# Patient Record
Sex: FEMALE | Race: WHITE | NOT HISPANIC OR LATINO | Employment: UNEMPLOYED
[De-identification: names, ages, dates, MRNs, and addresses within clinical notes are randomized per-mention and may not be internally consistent; named-entity substitution may affect disease eponyms.]

---

## 2021-05-01 ENCOUNTER — HEALTH MAINTENANCE LETTER (OUTPATIENT)
Age: 51
End: 2021-05-01

## 2021-10-11 ENCOUNTER — HEALTH MAINTENANCE LETTER (OUTPATIENT)
Age: 51
End: 2021-10-11

## 2021-10-18 ENCOUNTER — HOSPITAL ENCOUNTER (EMERGENCY)
Facility: CLINIC | Age: 51
Discharge: HOME | End: 2021-10-18
Attending: EMERGENCY MEDICINE | Admitting: EMERGENCY MEDICINE
Payer: COMMERCIAL

## 2021-10-18 ENCOUNTER — APPOINTMENT (OUTPATIENT)
Dept: CT IMAGING | Facility: CLINIC | Age: 51
End: 2021-10-18
Attending: EMERGENCY MEDICINE
Payer: COMMERCIAL

## 2021-10-18 VITALS
DIASTOLIC BLOOD PRESSURE: 63 MMHG | OXYGEN SATURATION: 100 % | SYSTOLIC BLOOD PRESSURE: 118 MMHG | RESPIRATION RATE: 16 BRPM | WEIGHT: 170 LBS | HEART RATE: 59 BPM | TEMPERATURE: 98.3 F

## 2021-10-18 DIAGNOSIS — N20.0 KIDNEY STONE: ICD-10-CM

## 2021-10-18 LAB
ALBUMIN SERPL-MCNC: 3.6 G/DL (ref 3.4–5)
ALBUMIN UR-MCNC: NEGATIVE MG/DL
ALP SERPL-CCNC: 68 U/L (ref 40–150)
ALT SERPL W P-5'-P-CCNC: 31 U/L (ref 0–50)
ANION GAP SERPL CALCULATED.3IONS-SCNC: 8 MMOL/L (ref 3–14)
APPEARANCE UR: CLEAR
AST SERPL W P-5'-P-CCNC: 21 U/L (ref 0–45)
BASOPHILS # BLD AUTO: 0.1 10E3/UL (ref 0–0.2)
BASOPHILS NFR BLD AUTO: 1 %
BILIRUB SERPL-MCNC: 0.6 MG/DL (ref 0.2–1.3)
BILIRUB UR QL STRIP: NEGATIVE
BUN SERPL-MCNC: 10 MG/DL (ref 7–30)
CALCIUM SERPL-MCNC: 8.6 MG/DL (ref 8.5–10.1)
CHLORIDE BLD-SCNC: 105 MMOL/L (ref 94–109)
CO2 SERPL-SCNC: 24 MMOL/L (ref 20–32)
COLOR UR AUTO: YELLOW
CREAT SERPL-MCNC: 0.7 MG/DL (ref 0.52–1.04)
EOSINOPHIL # BLD AUTO: 0 10E3/UL (ref 0–0.7)
EOSINOPHIL NFR BLD AUTO: 0 %
ERYTHROCYTE [DISTWIDTH] IN BLOOD BY AUTOMATED COUNT: 13.2 % (ref 10–15)
GFR SERPL CREATININE-BSD FRML MDRD: >90 ML/MIN/1.73M2
GLUCOSE BLD-MCNC: 114 MG/DL (ref 70–99)
GLUCOSE UR STRIP-MCNC: NEGATIVE MG/DL
HCT VFR BLD AUTO: 41.4 % (ref 35–47)
HGB BLD-MCNC: 13.7 G/DL (ref 11.7–15.7)
HGB UR QL STRIP: ABNORMAL
IMM GRANULOCYTES # BLD: 0 10E3/UL
IMM GRANULOCYTES NFR BLD: 0 %
KETONES UR STRIP-MCNC: 20 MG/DL
LEUKOCYTE ESTERASE UR QL STRIP: NEGATIVE
LIPASE SERPL-CCNC: 86 U/L (ref 73–393)
LYMPHOCYTES # BLD AUTO: 0.7 10E3/UL (ref 0.8–5.3)
LYMPHOCYTES NFR BLD AUTO: 5 %
MCH RBC QN AUTO: 31 PG (ref 26.5–33)
MCHC RBC AUTO-ENTMCNC: 33.1 G/DL (ref 31.5–36.5)
MCV RBC AUTO: 94 FL (ref 78–100)
MONOCYTES # BLD AUTO: 0.5 10E3/UL (ref 0–1.3)
MONOCYTES NFR BLD AUTO: 4 %
MUCOUS THREADS #/AREA URNS LPF: PRESENT /LPF
NEUTROPHILS # BLD AUTO: 12 10E3/UL (ref 1.6–8.3)
NEUTROPHILS NFR BLD AUTO: 90 %
NITRATE UR QL: NEGATIVE
NRBC # BLD AUTO: 0 10E3/UL
NRBC BLD AUTO-RTO: 0 /100
PH UR STRIP: 6.5 [PH] (ref 5–7)
PLATELET # BLD AUTO: 255 10E3/UL (ref 150–450)
POTASSIUM BLD-SCNC: 3.9 MMOL/L (ref 3.4–5.3)
PROT SERPL-MCNC: 7.3 G/DL (ref 6.8–8.8)
RBC # BLD AUTO: 4.42 10E6/UL (ref 3.8–5.2)
RBC URINE: 17 /HPF
SODIUM SERPL-SCNC: 137 MMOL/L (ref 133–144)
SP GR UR STRIP: 1.02 (ref 1–1.03)
SQUAMOUS EPITHELIAL: <1 /HPF
UROBILINOGEN UR STRIP-MCNC: NORMAL MG/DL
WBC # BLD AUTO: 13.3 10E3/UL (ref 4–11)
WBC URINE: 1 /HPF
YEAST #/AREA URNS HPF: ABNORMAL /HPF

## 2021-10-18 PROCEDURE — 99285 EMERGENCY DEPT VISIT HI MDM: CPT | Mod: 25

## 2021-10-18 PROCEDURE — 74176 CT ABD & PELVIS W/O CONTRAST: CPT

## 2021-10-18 PROCEDURE — 85025 COMPLETE CBC W/AUTO DIFF WBC: CPT | Performed by: EMERGENCY MEDICINE

## 2021-10-18 PROCEDURE — 81001 URINALYSIS AUTO W/SCOPE: CPT | Performed by: EMERGENCY MEDICINE

## 2021-10-18 PROCEDURE — 36415 COLL VENOUS BLD VENIPUNCTURE: CPT | Performed by: EMERGENCY MEDICINE

## 2021-10-18 PROCEDURE — 96374 THER/PROPH/DIAG INJ IV PUSH: CPT

## 2021-10-18 PROCEDURE — 83690 ASSAY OF LIPASE: CPT | Performed by: EMERGENCY MEDICINE

## 2021-10-18 PROCEDURE — 80053 COMPREHEN METABOLIC PANEL: CPT | Performed by: EMERGENCY MEDICINE

## 2021-10-18 PROCEDURE — 81003 URINALYSIS AUTO W/O SCOPE: CPT | Performed by: EMERGENCY MEDICINE

## 2021-10-18 PROCEDURE — 250N000011 HC RX IP 250 OP 636: Performed by: EMERGENCY MEDICINE

## 2021-10-18 RX ORDER — KETOROLAC TROMETHAMINE 15 MG/ML
15 INJECTION, SOLUTION INTRAMUSCULAR; INTRAVENOUS ONCE
Status: COMPLETED | OUTPATIENT
Start: 2021-10-18 | End: 2021-10-18

## 2021-10-18 RX ADMIN — KETOROLAC TROMETHAMINE 15 MG: 15 INJECTION, SOLUTION INTRAMUSCULAR; INTRAVENOUS at 20:12

## 2021-10-18 ASSESSMENT — ENCOUNTER SYMPTOMS
CHILLS: 1
FEVER: 0
DYSURIA: 0
DIARRHEA: 1
ABDOMINAL PAIN: 1
BLOOD IN STOOL: 0
BACK PAIN: 1

## 2021-10-18 NOTE — ED PROVIDER NOTES
History   Chief Complaint:  Abdominal Pain       HPI   Echo Edwards is a 51 year old female who presents with right lower quadrant abdominal pain with radiation into her back that started this morning. She notes that her pain subsides but shortly returned towards her umbilicus. Again, the pain subside and returned back to the right side. The patient called her PCP for advice who recommended evaluation. Here, her pain is a 5/10. Mentions that she does have chills and had about 4-5 episodes of diarrhea this morning. Reports a history of tubal ligation but denies other surgeries. She denies fever, bloody stools, or dysuria. Occasional alcohol. No drug or tobacco use.     Review of Systems   Constitutional: Positive for chills. Negative for fever.   Gastrointestinal: Positive for abdominal pain and diarrhea. Negative for blood in stool.   Genitourinary: Negative for dysuria.   Musculoskeletal: Positive for back pain.   All other systems reviewed and are negative.    Allergies:  The patient has no known allergies.     Medications:  The patient is not currently taking any prescribed medications.    Past Medical History:     Skin cancer    Past Surgical History:    Tubal ligation    Social History:  The patient presents to the ED alone.     Physical Exam     Patient Vitals for the past 24 hrs:   BP Temp Temp src Pulse Resp SpO2 Weight   10/18/21 1731 118/63 98.3  F (36.8  C) Temporal 59 16 100 % --   10/18/21 1728 -- 98  F (36.7  C) Temporal -- -- 98 % 77.1 kg (170 lb)     Physical Exam     General: alert, lying comfortably on gurney  HENT: mucous membranes moist  CV: regular rate, regular rhythm  Resp: normal effort, clear throughout, no crackles or wheezing  GI: abdomen soft.  Mild right sided CVA tenderness.  Mild right lower quadrant abdominal tenderness.  MSK: no bony tenderness  Skin: appropriately warm and dry  Extremities: no edema, calves non-tender  Neuro: alert, clear speech, oriented  Psych: normal mood  and affect    Emergency Department Course     Imaging:  CT Abdomen Pelvis w/o Contrast   Final Result   IMPRESSION:    1.  Moderate right-sided hydronephrosis. There are multiple calcifications in the pelvis with several of these near the right ureter. These are compatible with phleboliths but there is likely a small 2 to 3 mm stone in the distal right ureter.      2.  5.5 cm cyst within the right ovary.           Report per radiology    Laboratory:  Labs Ordered and Resulted from Time of ED Arrival Up to the Time of Departure from the ED   UA MACROSCOPIC WITH REFLEX TO MICRO AND CULTURE - Abnormal; Notable for the following components:       Result Value    Ketones Urine 20  (*)     Blood Urine Moderate (*)     Budding Yeast Urine Few (*)     Mucus Urine Present (*)     RBC Urine 17 (*)     All other components within normal limits    Narrative:     Urine Culture not indicated   COMPREHENSIVE METABOLIC PANEL - Abnormal; Notable for the following components:    Glucose 114 (*)     All other components within normal limits   CBC WITH PLATELETS AND DIFFERENTIAL - Abnormal; Notable for the following components:    WBC Count 13.3 (*)     Absolute Neutrophils 12.0 (*)     Absolute Lymphocytes 0.7 (*)     All other components within normal limits   LIPASE - Normal   PERIPHERAL IV CATHETER   CBC WITH PLATELETS & DIFFERENTIAL    Narrative:     The following orders were created for panel order CBC with platelets differential.  Procedure                               Abnormality         Status                     ---------                               -----------         ------                     CBC with platelets and d...[[id number]]  Abnormal            Final result                 Please view results for these tests on the individual orders.      Emergency Department Course:  Reviewed:  I reviewed nursing notes, vitals, past medical history and Care Everywhere    Assessments:  1826 I obtained history and examined the  patient as noted above.     2009 I rechecked the patient and explained findings.     Interventions:  2012 Toradol 15 mg IV    Disposition:  The patient was discharged to home.     Impression & Plan     Medical Decision Making:  Echo Edwards is a 51 year old female who presented with right flank and abdominal pain consistent with renal colic. CT confirms a likely 3 mm ureteral stone at the distal ureter.  Renal function is normal.  CT and lab workup show no other alternative etiology that could be causing her symptoms (e.g., AAA, appendicitis, pyelonephritis). There is no fever or convincing evidence of a urinary tract infection. On recheck, her pain is controlled with interventions in the ED and she is tolerating POs. I taking ibuprofen and Tylenol as directed for pain, given that symptoms are mild and she wishes to avoid narcotics. I have advised her to return for uncontrolled pain, vomiting, fever, or any other concerning symptoms. I also advised to strain her urine to look for a stone.  Finally, I have advised follow up with urology within 5 to 7 days if she fails to pass the stone.    Diagnosis:    ICD-10-CM    1. Kidney stone  N20.0      Scribe Disclosure:  I, Alphonse Concepcion, am serving as a scribe at 6:40 PM on 10/18/2021 to document services personally performed by Virgen Cruz MD based on my observations and the provider's statements to me.           Virgen Cruz MD  10/18/21 2563

## 2021-10-19 NOTE — DISCHARGE INSTRUCTIONS
You can take ibuprofen, 400 mg every 8 hours as needed for pain.  You can also take Tylenol as needed, as directed on the bottle.    Discharge Instructions  Kidney Stones    Kidney stones are a common problem that can cause a lot of pain but fortunately are usually not dangerous. Kidney stones form in the kidney and then can cause a blockage (obstruction) of the flow of urine from the kidney which leads to pain. Most patients can manage kidney stones at home (without a hospital stay).  However, sometimes your condition may be worse than it seemed at first, or may get worse with time. Most kidney stones will pass on their own, but occasionally stones may need to be removed by an urologist.    Generally, every Emergency Department visit should have a follow-up clinic visit with either a primary or a specialty clinic/provider. Please follow-up as instructed by your emergency provider today.      Return to the Emergency Department if:  Your pain is not controlled despite the medications provided or recommended.  You are vomiting (throwing up) and cannot keep fluids or medications down.  You develop a fever (>100.4 F).  You feel much more ill or develop new symptoms.  What can I do to help myself?  Be sure to drink plenty of fluids.  If instructed to do so, strain your urine (pee) with the urine strainer you were provided with today. Your stone may look like a grain of sand or a small pebble. Collect any stones in the cup provided and bring to your follow-up appointment.  Staying active is good, and may help the stone to pass. You may do whatever you feel up to doing without restrictions.   Treatment:  Non-steroidal anti-inflammatory drugs (NSAIDs). This includes prescription medicines like Toradol  (ketorolac) and non-prescription medicines like Advil  (ibuprofen) and Nuprin  (ibuprofen) and Naproxen. These pain relievers are very effective for kidney stones.  Nausea (sick to your stomach) medication.  Nausea and  vomiting are common with kidney stones, so your provider may send you home with medicine for this.   Flomax  (tamsulosin). This medicine is sometimes used for men with prostate problems, but also can help kidney stones to pass. Its effectiveness is controversial or questionable so it is prescribed in certain situations. This medicine can lower blood pressure, and you may feel faint/lightheaded, especially when you first stand up. Be sure to get up gradually, sit down if you feel faint, and avoid activity where feeling faint would be dangerous, such as climbing ladders.  If you were given a prescription for medicine here today, be sure to read all of the information (including the package insert) that comes with your prescription.  This will include important information about the medicine, its side effects, and any warnings that you need to know about.  The pharmacist who fills the prescription can provide more information and answer questions you may have about the medicine.  If you have questions or concerns that the pharmacist cannot address, please call or return to the Emergency Department.   Remember that you can always come back to the Emergency Department if you are not able to see your regular provider in the amount of time listed above, if you get any new symptoms, or if there is anything that worries you.

## 2021-10-26 ENCOUNTER — OFFICE VISIT (OUTPATIENT)
Dept: UROLOGY | Facility: CLINIC | Age: 51
End: 2021-10-26
Payer: COMMERCIAL

## 2021-10-26 VITALS
DIASTOLIC BLOOD PRESSURE: 60 MMHG | SYSTOLIC BLOOD PRESSURE: 92 MMHG | WEIGHT: 170 LBS | HEART RATE: 82 BPM | BODY MASS INDEX: 25.76 KG/M2 | OXYGEN SATURATION: 98 % | HEIGHT: 68 IN

## 2021-10-26 DIAGNOSIS — N20.0 CALCULUS OF KIDNEY: Primary | ICD-10-CM

## 2021-10-26 LAB
ALBUMIN UR-MCNC: NEGATIVE MG/DL
APPEARANCE UR: CLEAR
BILIRUB UR QL STRIP: NEGATIVE
COLOR UR AUTO: YELLOW
GLUCOSE UR STRIP-MCNC: NEGATIVE MG/DL
HGB UR QL STRIP: ABNORMAL
KETONES UR STRIP-MCNC: NEGATIVE MG/DL
LEUKOCYTE ESTERASE UR QL STRIP: NEGATIVE
NITRATE UR QL: NEGATIVE
PH UR STRIP: 7 [PH] (ref 5–7)
SP GR UR STRIP: 1.01 (ref 1–1.03)
UROBILINOGEN UR STRIP-ACNC: 0.2 E.U./DL

## 2021-10-26 PROCEDURE — 99204 OFFICE O/P NEW MOD 45 MIN: CPT | Performed by: UROLOGY

## 2021-10-26 PROCEDURE — 81003 URINALYSIS AUTO W/O SCOPE: CPT | Mod: QW | Performed by: UROLOGY

## 2021-10-26 ASSESSMENT — MIFFLIN-ST. JEOR: SCORE: 1424.61

## 2021-10-26 ASSESSMENT — PAIN SCALES - GENERAL: PAINLEVEL: NO PAIN (0)

## 2021-10-26 NOTE — PROGRESS NOTES
Name: Echo Edwards   MRN: [id number]  YOB: 1970    Assessment and Plan:  51 year old female with right ureteral stone that is possibly been passed.     1. Calculus of kidney  Discussed options including observation, ultrasound, or CT of the pelvis.  Given the fact that she may have passed a stone however what she remembers straining does not seem like it could be the appropriate size for the ureteral stone that she had.  Although she has resolution of pain, discussed that there are's patients who still have retained stone that can lead to further issues down the road with loss of renal function.  Would not recommend KUB given multiple pelvic phleboliths seen on CT.    After discussion, we will do a CT of the pelvis to ensure passage of the stone.  If is not passed, would need to further discuss ureteroscopy to manage his stone.    Will contact patient back with the CT results.  Given this is her first stone and there are no remaining ones in the kidney, she will consider a 24-hour urine study but is not obligated to do this at this time and is likely at low risk for future stone development.    Plan: follow-up CT results by phone.    - UA without Microscopic [[id number]]; Future  - UA without Microscopic [[id number]]  - CT Abdomen Pelvis w/o Contrast [[id number]]; Future        Orders Placed This Encounter   Procedures     UA without Microscopic [[id number]]            Chief Complaint: right ureteral stone    History of Present Illness:  Echo Edwards is a 51 year old female seen in consultation from ED for discussion right ureteral stone.      The current episode was first found due to renal colic.  This led to evaluation in the ED which demonstrated 3 mm right ureteral stone.  Imaging (CT on 10/18) (images personally reviewed) which demonstrated:  Right Kidney: 3 mm distal ureteral  Left Kidney: None  Additional relevant findings: Adnexal cyst    Currently, they are experiencing no flank pain, no nausea,  "no vomiting, no hematuria, or no dysuria.  They have not experienced recent stone passage.      I reviewed internal records which in summarized above.    I reviewed internal labs, of which pertinent ones include: Hgb 13.7, Cr 0.7, Ca 8.6, urine pH 7.0    Pertinent stone history:  -- Personal stone history  -- Family stone history    Pertinent stone medical history  -- Gastric bypass surgery  -- Sarcoidosis  -- Inflammatory bowel disease  -- History of non-stone  surgery -  -- Neurologic disease limiting mobility  -- Osteoporosis  -- Diabetes  -- Gout    Pertinent medications:  -- Antiplatelet  -- Anticoagulant  -- Topiramate   -- Thiazaide  -- Potassium supplement         Past Medical History:  Past Medical History:   Diagnosis Date     Kidney stone             Past Surgical History:  History reviewed. No pertinent surgical history.         Social History:  Social History     Tobacco Use     Smoking status: Never Smoker     Smokeless tobacco: Never Used   Substance Use Topics     Alcohol use: Yes     Comment: rare     Drug use: Never            Family History:  History reviewed. No pertinent family history.         Allergies:  No Known Allergies         Medications:  No current outpatient medications on file.     No current facility-administered medications for this visit.       Review of Systems:   ROS: 10 point ROS neg other than the symptoms noted above in the HPI.    Physical Exam:  B/P: 92/60, T: Data Unavailable, P: 82, R: Data Unavailable  Estimated body mass index is 25.85 kg/m  as calculated from the following:    Height as of this encounter: 1.727 m (5' 8\").    Weight as of this encounter: 77.1 kg (170 lb).  General Appearance Adult: Alert, no acute distress, oriented  Lungs: no respiratory distress, or pursed lip breathing  Neuro: Alert, oriented, speech and mentation normal  Psych: affect and mood normal       Labs:     Creatinine   Date Value Ref Range Status   10/18/2021 0.70 0.52 - 1.04 mg/dL Final "     No results found for: UA  No components found for: UC      Outside records:   I spent 0 minutes reviewing outside records.      Matt Escobedo MD  October 26, 2021

## 2021-10-26 NOTE — LETTER
10/26/2021       RE: Echo Edwards  [address] [place] [address]     Dear Colleague,    Thank you for referring your patient, Echo Edwards, to the [facility]. Please see a copy of my visit note below.    Name: Echo Edwards   MRN: [id number]  YOB: 1970    Assessment and Plan:  51 year old female with right ureteral stone that is possibly been passed.     1. Calculus of kidney  Discussed options including observation, ultrasound, or CT of the pelvis.  Given the fact that she may have passed a stone however what she remembers straining does not seem like it could be the appropriate size for the ureteral stone that she had.  Although she has resolution of pain, discussed that there are's patients who still have retained stone that can lead to further issues down the road with loss of renal function.  Would not recommend KUB given multiple pelvic phleboliths seen on CT.    After discussion, we will do a CT of the pelvis to ensure passage of the stone.  If is not passed, would need to further discuss ureteroscopy to manage his stone.    Will contact patient back with the CT results.  Given this is her first stone and there are no remaining ones in the kidney, she will consider a 24-hour urine study but is not obligated to do this at this time and is likely at low risk for future stone development.    Plan: follow-up CT results by phone.    - UA without Microscopic [[id number]]; Future  - UA without Microscopic [[id number]]  - CT Abdomen Pelvis w/o Contrast [[id number]]; Future        Orders Placed This Encounter   Procedures     UA without Microscopic [[id number]]            Chief Complaint: right ureteral stone    History of Present Illness:  Echo Edwards is a 51 year old female seen in consultation from ED for discussion right ureteral stone.      The current episode was first found due to renal colic.  This led  "to evaluation in the ED which demonstrated 3 mm right ureteral stone.  Imaging (CT on 10/18) (images personally reviewed) which demonstrated:  Right Kidney: 3 mm distal ureteral  Left Kidney: None  Additional relevant findings: Adnexal cyst    Currently, they are experiencing no flank pain, no nausea, no vomiting, no hematuria, or no dysuria.  They have not experienced recent stone passage.      I reviewed internal records which in summarized above.    I reviewed internal labs, of which pertinent ones include: Hgb 13.7, Cr 0.7, Ca 8.6, urine pH 7.0    Pertinent stone history:  -- Personal stone history  -- Family stone history    Pertinent stone medical history  -- Gastric bypass surgery  -- Sarcoidosis  -- Inflammatory bowel disease  -- History of non-stone  surgery -  -- Neurologic disease limiting mobility  -- Osteoporosis  -- Diabetes  -- Gout    Pertinent medications:  -- Antiplatelet  -- Anticoagulant  -- Topiramate   -- Thiazaide  -- Potassium supplement         Past Medical History:  Past Medical History:   Diagnosis Date     Kidney stone             Past Surgical History:  History reviewed. No pertinent surgical history.         Social History:  Social History     Tobacco Use     Smoking status: Never Smoker     Smokeless tobacco: Never Used   Substance Use Topics     Alcohol use: Yes     Comment: rare     Drug use: Never            Family History:  History reviewed. No pertinent family history.         Allergies:  No Known Allergies         Medications:  No current outpatient medications on file.     No current facility-administered medications for this visit.       Review of Systems:   ROS: 10 point ROS neg other than the symptoms noted above in the HPI.    Physical Exam:  B/P: 92/60, T: Data Unavailable, P: 82, R: Data Unavailable  Estimated body mass index is 25.85 kg/m  as calculated from the following:    Height as of this encounter: 1.727 m (5' 8\").    Weight as of this encounter: 77.1 kg (170 " lb).  General Appearance Adult: Alert, no acute distress, oriented  Lungs: no respiratory distress, or pursed lip breathing  Neuro: Alert, oriented, speech and mentation normal  Psych: affect and mood normal       Labs:     Creatinine   Date Value Ref Range Status   10/18/2021 0.70 0.52 - 1.04 mg/dL Final     No results found for: UA  No components found for: UC      Outside records:   I spent 0 minutes reviewing outside records.      Matt Escobedo MD  October 26, 2021

## 2021-10-28 ENCOUNTER — HOSPITAL ENCOUNTER (OUTPATIENT)
Dept: CT IMAGING | Facility: CLINIC | Age: 51
Discharge: HOME | End: 2021-10-28
Attending: UROLOGY | Admitting: UROLOGY
Payer: COMMERCIAL

## 2021-10-28 DIAGNOSIS — N20.0 CALCULUS OF KIDNEY: ICD-10-CM

## 2021-10-28 PROCEDURE — 74176 CT ABD & PELVIS W/O CONTRAST: CPT

## 2021-11-22 ENCOUNTER — LAB REQUISITION (OUTPATIENT)
Dept: LAB | Facility: CLINIC | Age: 51
End: 2021-11-22
Payer: COMMERCIAL

## 2021-11-22 PROCEDURE — 88305 TISSUE EXAM BY PATHOLOGIST: CPT | Mod: TC,ORL | Performed by: SPECIALIST

## 2021-11-23 LAB
PATH REPORT.COMMENTS IMP SPEC: NORMAL
PATH REPORT.COMMENTS IMP SPEC: NORMAL
PATH REPORT.FINAL DX SPEC: NORMAL
PATH REPORT.GROSS SPEC: NORMAL
PATH REPORT.MICROSCOPIC SPEC OTHER STN: NORMAL
PATH REPORT.RELEVANT HX SPEC: NORMAL
PHOTO IMAGE: NORMAL

## 2021-11-23 PROCEDURE — 88305 TISSUE EXAM BY PATHOLOGIST: CPT | Mod: 26 | Performed by: PATHOLOGY

## 2022-05-10 ENCOUNTER — TELEPHONE (OUTPATIENT)
Dept: OTHER | Facility: CLINIC | Age: 52
End: 2022-05-10
Payer: COMMERCIAL

## 2022-05-10 NOTE — TELEPHONE ENCOUNTER
[facility]    Who is the provider?:  Self referral     Preferred provider location: [place]    Person calling: Echo Thompson  Call back phone: [phone]       Nurse call back needed: NO          Can we leave a message?  YES        Reason for call:    [facility] evaluated patient for blunt force trauma to varicose vein on lower, inner left leg. Patient is experiencing sharp pain at site of injury and wants to be evalauted by [facility].    Outside imaging: X-ray @ [facility]    Name / Loc of pharmacy: n/a

## 2022-05-16 ENCOUNTER — OFFICE VISIT (OUTPATIENT)
Dept: VASCULAR SURGERY | Facility: CLINIC | Age: 52
End: 2022-05-16
Payer: COMMERCIAL

## 2022-05-16 DIAGNOSIS — I83.92 ASYMPTOMATIC VARICOSE VEINS OF LEFT LOWER EXTREMITY: Primary | ICD-10-CM

## 2022-05-16 PROCEDURE — 99203 OFFICE O/P NEW LOW 30 MIN: CPT | Performed by: INTERNAL MEDICINE

## 2022-05-16 RX ORDER — MEDROXYPROGESTERONE ACETATE 10 MG
TABLET ORAL
COMMUNITY
Start: 2022-04-18

## 2022-05-16 NOTE — PROGRESS NOTES
"    Vein Clinic Consultation Note    HPI:  Echo Edwards is a 52 year old female who was seen today in consultation for left lower leg pain, swelling and bruising following blunt force injury.  Echo states that about a week and a half ago her  tripped and landed on her left lower leg.  She noted immediate pain and over several days worsening bruising and swelling over the anterior shin and ankle. She was evaluated by [facility] where plain films were performed and patient reports were negative for any fracture.  She was advised to wear compression stockings.  She keeps her leg elevated when at rest, but has also been careful to stay moving around the house, shopping etc.  She was instructed to make the \"alphabet\" with her left food/leg to keep things moving.  She denies any redness, warmth, pain in the posterior calf.    She has a hx of asymptomatic varicose veins on her left leg and presents today to make sure that there are no issues with her veins.  Echo states she first noted these varicose veins on her left leg following her pregnancy fifteen years ago.  She denies any symptoms such has pain, heaviness, itching.  No hx of bleeding or thrombophlebitis.        Review of Systems   All other systems reviewed and are negative.      PHH:    Past Medical History:   Diagnosis Date     Kidney stone       No past surgical history on file.    ALLERGIES:  Patient has no known allergies.    No Known Allergies    MEDS:  No current outpatient medications on file.    SOCIAL HABITS:    History   Smoking Status     Never Smoker   Smokeless Tobacco     Never Used     Social History    Substance and Sexual Activity      Alcohol use: Yes        Comment: rare      History   Drug Use Unknown       PHYSICAL EXAMINATION:  Focused exam of the leg leg:  There is a large area of hematoma present in the anterior shin and extending to the area of the anterior/medial ankle with associated swelling.  The posterior calf is soft and " nontender.  There are small visible varicosities noted in the distal anterior thigh and the medial knee.           VEIN CLINIC LEG DRAWING    ASSESSMENT:   1.  Large anterior shin hematoma:  Following blunt force trauma.  She has been wearing compression stockings and has remained active.  No posterior calf tenderness, swelling, erythema.    2.  Asymptomatic varicose veins        PLAN:   -Recommend continued compression stockings to be worn daily, leg elevation while at rest and remaining active.  She will continue to be active and is performing some leg exercises as recommended by [facility].  No indication for ultrasound at this time.    -Reviewed the pathophysiology of varicose veins and treatment.  Recommend continued medical management with daily compression therapy.  -Okay to fly later this week with compression stockings in place and taking brief walks on the plane.  -Follow up PRN.        Deborah Barr MD [facility]   no

## 2022-05-16 NOTE — NURSING NOTE
Patient Reported symptoms:    Left Leg   Heaviness None of the time   Achiness A little of the time   Swelling None of the time   Throbbing None of the time   Itching None of the time   Appearance Slightly noticeable   Impact on work/activities Symptoms but full able to participate

## 2022-05-16 NOTE — LETTER
"    5/16/2022         RE: Echo Edwards  [address]  [facility] [address]        Dear Colleague,    Thank you for referring your patient, Echo Edwards, to the [facility] [place]. Please see a copy of my visit note below.        Vein Clinic Consultation Note    HPI:  Echo Edwards is a 52 year old female who was seen today in consultation for left lower leg pain, swelling and bruising following blunt force injury.  Echo states that about a week and a half ago her  tripped and landed on her left lower leg.  She noted immediate pain and over several days worsening bruising and swelling over the anterior shin and ankle. She was evaluated by [facility] where plain films were performed and patient reports were negative for any fracture.  She was advised to wear compression stockings.  She keeps her leg elevated when at rest, but has also been careful to stay moving around the house, shopping etc.  She was instructed to make the \"alphabet\" with her left food/leg to keep things moving.  She denies any redness, warmth, pain in the posterior calf.    She has a hx of asymptomatic varicose veins on her left leg and presents today to make sure that there are no issues with her veins.  Echo states she first noted these varicose veins on her left leg following her pregnancy fifteen years ago.  She denies any symptoms such has pain, heaviness, itching.  No hx of bleeding or thrombophlebitis.        Review of Systems   All other systems reviewed and are negative.      PHH:    Past Medical History:   Diagnosis Date     Kidney stone       No past surgical history on file.    ALLERGIES:  Patient has no known allergies.    No Known Allergies    MEDS:  No current outpatient medications on file.    SOCIAL HABITS:    History   Smoking Status     Never Smoker   Smokeless Tobacco     Never Used     Social History    Substance and Sexual Activity      Alcohol use: Yes        Comment: rare      History   Drug " Use Unknown       PHYSICAL EXAMINATION:  Focused exam of the leg leg:  There is a large area of hematoma present in the anterior shin and extending to the area of the anterior/medial ankle with associated swelling.  The posterior calf is soft and nontender.  There are small visible varicosities noted in the distal anterior thigh and the medial knee.           VEIN CLINIC LEG DRAWING    ASSESSMENT:   1.  Large anterior shin hematoma:  Following blunt force trauma.  She has been wearing compression stockings and has remained active.  No posterior calf tenderness, swelling, erythema.    2.  Asymptomatic varicose veins        PLAN:   -Recommend continued compression stockings to be worn daily, leg elevation while at rest and remaining active.  She will continue to be active and is performing some leg exercises as recommended by [facility].  No indication for ultrasound at this time.    -Reviewed the pathophysiology of varicose veins and treatment.  Recommend continued medical management with daily compression therapy.  -Okay to fly later this week with compression stockings in place and taking brief walks on the plane.  -Follow up PRN.        Deborah Barr MD [facility]      Again, thank you for allowing me to participate in the care of your patient.        Sincerely,        Deborah Barr MD

## 2022-05-22 ENCOUNTER — HEALTH MAINTENANCE LETTER (OUTPATIENT)
Age: 52
End: 2022-05-22

## 2022-09-25 ENCOUNTER — HEALTH MAINTENANCE LETTER (OUTPATIENT)
Age: 52
End: 2022-09-25

## 2022-12-08 ENCOUNTER — ANCILLARY PROCEDURE (OUTPATIENT)
Dept: CT IMAGING | Facility: CLINIC | Age: 52
End: 2022-12-08
Attending: FAMILY MEDICINE
Payer: COMMERCIAL

## 2022-12-08 DIAGNOSIS — R10.9 ABDOMINAL PAIN: ICD-10-CM

## 2022-12-08 DIAGNOSIS — N83.209 OVARIAN CYST: ICD-10-CM

## 2022-12-08 LAB — RADIOLOGIST FLAGS: NORMAL

## 2022-12-08 PROCEDURE — 74177 CT ABD & PELVIS W/CONTRAST: CPT

## 2022-12-08 PROCEDURE — 255N000002 HC RX 255 OP 636: Performed by: FAMILY MEDICINE

## 2022-12-08 RX ADMIN — IOHEXOL 100 ML: 350 INJECTION, SOLUTION INTRAVENOUS at 15:53

## 2022-12-13 ENCOUNTER — ANCILLARY PROCEDURE (OUTPATIENT)
Dept: CT IMAGING | Facility: CLINIC | Age: 52
End: 2022-12-13
Attending: INTERNAL MEDICINE
Payer: COMMERCIAL

## 2022-12-13 DIAGNOSIS — C78.6 PERITONEAL CARCINOMATOSIS (H): ICD-10-CM

## 2022-12-13 LAB — RADIOLOGIST FLAGS: ABNORMAL

## 2022-12-13 PROCEDURE — 71260 CT THORAX DX C+: CPT

## 2022-12-13 PROCEDURE — 255N000002 HC RX 255 OP 636: Performed by: INTERNAL MEDICINE

## 2022-12-13 RX ADMIN — IOHEXOL 80 ML: 350 INJECTION, SOLUTION INTRAVENOUS at 08:39

## 2023-04-27 ENCOUNTER — ANCILLARY PROCEDURE (OUTPATIENT)
Dept: CT IMAGING | Facility: CLINIC | Age: 53
End: 2023-04-27
Attending: OBSTETRICS & GYNECOLOGY
Payer: COMMERCIAL

## 2023-04-27 DIAGNOSIS — C56.3 MALIGNANT NEOPLASM OF BOTH OVARIES (H): ICD-10-CM

## 2023-04-27 PROCEDURE — 74177 CT ABD & PELVIS W/CONTRAST: CPT

## 2023-04-27 PROCEDURE — 255N000002 HC RX 255 OP 636: Performed by: OBSTETRICS & GYNECOLOGY

## 2023-04-27 RX ADMIN — IOHEXOL 100 ML: 350 INJECTION, SOLUTION INTRAVENOUS at 10:28

## 2023-06-04 ENCOUNTER — HEALTH MAINTENANCE LETTER (OUTPATIENT)
Age: 53
End: 2023-06-04

## 2023-08-04 ENCOUNTER — ANCILLARY PROCEDURE (OUTPATIENT)
Dept: CT IMAGING | Facility: CLINIC | Age: 53
End: 2023-08-04
Attending: OBSTETRICS & GYNECOLOGY
Payer: COMMERCIAL

## 2023-08-04 DIAGNOSIS — C56.3 MALIGNANT NEOPLASM OF BOTH OVARIES (H): ICD-10-CM

## 2023-08-04 PROCEDURE — 250N000009 HC RX 250: Performed by: OBSTETRICS & GYNECOLOGY

## 2023-08-04 PROCEDURE — 250N000011 HC RX IP 250 OP 636: Performed by: OBSTETRICS & GYNECOLOGY

## 2023-08-04 PROCEDURE — 74177 CT ABD & PELVIS W/CONTRAST: CPT

## 2023-08-04 RX ORDER — IOPAMIDOL 755 MG/ML
100 INJECTION, SOLUTION INTRAVASCULAR ONCE
Status: COMPLETED | OUTPATIENT
Start: 2023-08-04 | End: 2023-08-04

## 2023-08-04 RX ADMIN — IOPAMIDOL 100 ML: 755 INJECTION, SOLUTION INTRAVENOUS at 10:19

## 2023-08-04 RX ADMIN — SODIUM CHLORIDE 40 ML: 9 INJECTION, SOLUTION INTRAVENOUS at 10:19

## 2023-08-10 ENCOUNTER — TRANSFERRED RECORDS (OUTPATIENT)
Dept: HEALTH INFORMATION MANAGEMENT | Facility: CLINIC | Age: 53
End: 2023-08-10
Payer: COMMERCIAL

## 2024-02-09 ENCOUNTER — ANCILLARY PROCEDURE (OUTPATIENT)
Dept: CT IMAGING | Facility: CLINIC | Age: 54
End: 2024-02-09
Attending: NURSE PRACTITIONER
Payer: COMMERCIAL

## 2024-02-09 DIAGNOSIS — C56.9 PRIMARY MALIGNANT NEOPLASM OF OVARY (H): ICD-10-CM

## 2024-02-09 PROCEDURE — 71260 CT THORAX DX C+: CPT

## 2024-02-09 PROCEDURE — 250N000009 HC RX 250: Performed by: NURSE PRACTITIONER

## 2024-02-09 PROCEDURE — 250N000011 HC RX IP 250 OP 636: Performed by: NURSE PRACTITIONER

## 2024-02-09 RX ORDER — IOPAMIDOL 755 MG/ML
90 INJECTION, SOLUTION INTRAVASCULAR ONCE
Status: COMPLETED | OUTPATIENT
Start: 2024-02-09 | End: 2024-02-09

## 2024-02-09 RX ADMIN — SODIUM CHLORIDE 40 ML: 9 INJECTION, SOLUTION INTRAVENOUS at 09:03

## 2024-02-09 RX ADMIN — IOPAMIDOL 90 ML: 755 INJECTION, SOLUTION INTRAVENOUS at 09:03

## 2024-02-14 ENCOUNTER — MEDICAL CORRESPONDENCE (OUTPATIENT)
Dept: SCHEDULING | Facility: CLINIC | Age: 54
End: 2024-02-14
Payer: COMMERCIAL

## 2024-07-20 ENCOUNTER — HEALTH MAINTENANCE LETTER (OUTPATIENT)
Age: 54
End: 2024-07-20

## 2024-08-23 ENCOUNTER — ANCILLARY PROCEDURE (OUTPATIENT)
Dept: CT IMAGING | Facility: CLINIC | Age: 54
End: 2024-08-23
Attending: OBSTETRICS & GYNECOLOGY
Payer: COMMERCIAL

## 2024-08-23 DIAGNOSIS — C56.3: ICD-10-CM

## 2024-08-23 PROCEDURE — 250N000009 HC RX 250: Performed by: OBSTETRICS & GYNECOLOGY

## 2024-08-23 PROCEDURE — 71260 CT THORAX DX C+: CPT

## 2024-08-23 PROCEDURE — 250N000011 HC RX IP 250 OP 636: Performed by: OBSTETRICS & GYNECOLOGY

## 2024-08-23 RX ORDER — IOPAMIDOL 755 MG/ML
86 INJECTION, SOLUTION INTRAVASCULAR ONCE
Status: COMPLETED | OUTPATIENT
Start: 2024-08-23 | End: 2024-08-23

## 2024-08-23 RX ADMIN — IOPAMIDOL 86 ML: 755 INJECTION, SOLUTION INTRAVENOUS at 07:59

## 2024-08-23 RX ADMIN — SODIUM CHLORIDE 65 ML: 9 INJECTION, SOLUTION INTRAVENOUS at 07:59

## 2025-03-03 ENCOUNTER — ANCILLARY PROCEDURE (OUTPATIENT)
Dept: CT IMAGING | Facility: CLINIC | Age: 55
End: 2025-03-03
Attending: NURSE PRACTITIONER
Payer: COMMERCIAL

## 2025-03-03 DIAGNOSIS — C56.3 MALIGNANT NEOPLASM OF BOTH OVARIES (H): ICD-10-CM

## 2025-03-03 PROCEDURE — 250N000011 HC RX IP 250 OP 636: Performed by: NURSE PRACTITIONER

## 2025-03-03 PROCEDURE — 250N000009 HC RX 250: Performed by: NURSE PRACTITIONER

## 2025-03-03 PROCEDURE — 74177 CT ABD & PELVIS W/CONTRAST: CPT

## 2025-03-03 RX ORDER — IOPAMIDOL 755 MG/ML
86 INJECTION, SOLUTION INTRAVASCULAR ONCE
Status: COMPLETED | OUTPATIENT
Start: 2025-03-03 | End: 2025-03-03

## 2025-03-03 RX ADMIN — IOPAMIDOL 86 ML: 755 INJECTION, SOLUTION INTRAVENOUS at 08:41

## 2025-03-03 RX ADMIN — SODIUM CHLORIDE 40 ML: 9 INJECTION, SOLUTION INTRAVENOUS at 08:41

## 2025-08-09 ENCOUNTER — HEALTH MAINTENANCE LETTER (OUTPATIENT)
Age: 55
End: 2025-08-09

## 2025-09-02 ENCOUNTER — ANCILLARY PROCEDURE (OUTPATIENT)
Dept: CT IMAGING | Facility: CLINIC | Age: 55
End: 2025-09-02
Attending: NURSE PRACTITIONER
Payer: COMMERCIAL

## 2025-09-02 DIAGNOSIS — C56.3 MALIGNANT NEOPLASM OF BILATERAL OVARIES (H): ICD-10-CM

## 2025-09-02 PROCEDURE — 250N000011 HC RX IP 250 OP 636: Performed by: NURSE PRACTITIONER

## 2025-09-02 PROCEDURE — 250N000009 HC RX 250: Performed by: NURSE PRACTITIONER

## 2025-09-02 PROCEDURE — 74177 CT ABD & PELVIS W/CONTRAST: CPT

## 2025-09-02 RX ORDER — IOPAMIDOL 755 MG/ML
90 INJECTION, SOLUTION INTRAVASCULAR ONCE
Status: COMPLETED | OUTPATIENT
Start: 2025-09-02 | End: 2025-09-02

## 2025-09-02 RX ADMIN — SODIUM CHLORIDE 50 ML: 9 INJECTION, SOLUTION INTRAVENOUS at 08:47

## 2025-09-02 RX ADMIN — IOPAMIDOL 90 ML: 755 INJECTION, SOLUTION INTRAVENOUS at 08:47
